# Patient Record
Sex: MALE | Race: WHITE | NOT HISPANIC OR LATINO | Employment: OTHER | RURAL
[De-identification: names, ages, dates, MRNs, and addresses within clinical notes are randomized per-mention and may not be internally consistent; named-entity substitution may affect disease eponyms.]

---

## 2017-08-15 RX ORDER — RIVAROXABAN 20 MG/1
TABLET, FILM COATED ORAL
Qty: 30 TABLET | Refills: 4 | Status: SHIPPED | OUTPATIENT
Start: 2017-08-15 | End: 2018-03-14 | Stop reason: SDUPTHER

## 2017-12-14 ENCOUNTER — OFFICE VISIT (OUTPATIENT)
Dept: CARDIOLOGY | Facility: CLINIC | Age: 82
End: 2017-12-14

## 2017-12-14 VITALS
DIASTOLIC BLOOD PRESSURE: 68 MMHG | BODY MASS INDEX: 28.11 KG/M2 | HEART RATE: 77 BPM | SYSTOLIC BLOOD PRESSURE: 126 MMHG | HEIGHT: 71 IN | WEIGHT: 200.8 LBS

## 2017-12-14 DIAGNOSIS — I48.21 PERMANENT ATRIAL FIBRILLATION (HCC): Primary | ICD-10-CM

## 2017-12-14 PROCEDURE — 93000 ELECTROCARDIOGRAM COMPLETE: CPT | Performed by: INTERNAL MEDICINE

## 2017-12-14 PROCEDURE — 99213 OFFICE O/P EST LOW 20 MIN: CPT | Performed by: INTERNAL MEDICINE

## 2017-12-14 NOTE — PROGRESS NOTES
Subjective:     Encounter Date:12/14/2017      Patient ID: Gagan Mayer is a 87 y.o. male.    Chief Complaint: CAF    History of Present Illness    Dear Dr. Antunez:    I had the pleasure of seeing Gagan Mayer in cardiac followup today. As you well know, he is a jean 87-year-old man with history of chronic atrial fibrillation. He is anticoagulated with Xarelto. He has anemia and takes iron infusions periodically. This seems to be effective in maintaining his blood counts.    Over the past year he continues to do well. He cares for a large house and is quite active. He has reduced the amount of bicycle riding that he does.         Review of Systems   All other systems reviewed and are negative.        ECG 12 Lead  Date/Time: 12/14/2017 10:38 AM  Performed by: CASI GILES  Authorized by: CASI GILES   Comparison: compared with previous ECG   Similar to previous ECG  Rhythm: atrial fibrillation  BPM: 77                 Objective:     Physical Exam   Constitutional: He is oriented to person, place, and time. He appears well-developed and well-nourished.   HENT:   Head: Normocephalic and atraumatic.   Neck: Normal range of motion. Neck supple.   Cardiovascular: Normal rate and normal heart sounds.  An irregularly irregular rhythm present.   Pulmonary/Chest: Effort normal and breath sounds normal.   Abdominal: Soft. Bowel sounds are normal.   Musculoskeletal: Normal range of motion.   Neurological: He is alert and oriented to person, place, and time.   Skin: Skin is warm and dry.   Psychiatric: He has a normal mood and affect. His behavior is normal. Thought content normal.   Vitals reviewed.      Lab Review:       Assessment:          Diagnosis Plan   1. Permanent atrial fibrillation            Plan:       It was a pleasure to see your patient in cardiac followup today.  He is doing quite well from a cardiac standpoint.  He is tolerating his anticoagulation for atrial fibrillation without difficulty.  He has no  other cardiac symptoms at this time.  He will see me again in one year or sooner if symptoms warrant.      Atrial Fibrillation and Atrial Flutter  Assessment  • The patient has permanent atrial fibrillation  • This is non-valvular in etiology  • The patient's CHADS2-VASc score is 2  • A [id number] score of 2 or more is considered a high risk for a thromboembolic event  • Rivaroxaban prescribed    Plan  • Continue in atrial fibrillation with rate control  • Continue rivaroxaban for antithrombotic therapy, bleeding issues discussed

## 2018-03-14 RX ORDER — RIVAROXABAN 20 MG/1
TABLET, FILM COATED ORAL
Qty: 30 TABLET | Refills: 4 | Status: SHIPPED | OUTPATIENT
Start: 2018-03-14 | End: 2018-09-23 | Stop reason: SDUPTHER

## 2018-09-24 RX ORDER — RIVAROXABAN 20 MG/1
TABLET, FILM COATED ORAL
Qty: 30 TABLET | Refills: 4 | Status: SHIPPED | OUTPATIENT
Start: 2018-09-24 | End: 2019-04-08 | Stop reason: SDUPTHER

## 2018-12-13 ENCOUNTER — OFFICE VISIT (OUTPATIENT)
Dept: CARDIOLOGY | Facility: CLINIC | Age: 83
End: 2018-12-13

## 2018-12-13 VITALS
SYSTOLIC BLOOD PRESSURE: 138 MMHG | DIASTOLIC BLOOD PRESSURE: 88 MMHG | RESPIRATION RATE: 18 BRPM | WEIGHT: 213 LBS | HEIGHT: 71 IN | HEART RATE: 82 BPM | BODY MASS INDEX: 29.82 KG/M2

## 2018-12-13 DIAGNOSIS — I48.21 PERMANENT ATRIAL FIBRILLATION (HCC): Primary | ICD-10-CM

## 2018-12-13 PROCEDURE — 99213 OFFICE O/P EST LOW 20 MIN: CPT | Performed by: INTERNAL MEDICINE

## 2018-12-13 PROCEDURE — 93000 ELECTROCARDIOGRAM COMPLETE: CPT | Performed by: INTERNAL MEDICINE

## 2018-12-13 NOTE — PROGRESS NOTES
Subjective:     Encounter Date:12/13/2018      Patient ID: Gagan Mayer is a 88 y.o. male.    Chief Complaint: CAF    History of Present Illness    Dear Dr. Antunez,     I had the pleasure of seeing Gagan Mayer in cardiac followup today.  As you well know, he is a jean 88-year-old male with history of chronic atrial fibrillation.  He is anticoagulated with rivaroxaban.  He has anemia and occasionally takes iron transfusions.      Since I have last seen him, he reports that he has done great.  He continues to live independently.  He is able to do all his self-care as well as care for a large house.  He has had no real medical problems since I have last seen him.          Review of Systems   All other systems reviewed and are negative.        ECG 12 Lead  Date/Time: 12/13/2018 9:33 AM  Performed by: Vahid Ramirez MD  Authorized by: Vahid Ramirez MD   Comparison: compared with previous ECG   Similar to previous ECG  Rhythm: atrial fibrillation  BPM: 82                 Objective:     Physical Exam   Constitutional: He is oriented to person, place, and time. He appears well-developed and well-nourished.   HENT:   Head: Normocephalic and atraumatic.   Neck: Normal range of motion. Neck supple.   Cardiovascular: Normal rate and normal heart sounds. An irregularly irregular rhythm present.   Pulmonary/Chest: Effort normal and breath sounds normal.   Abdominal: Soft. Bowel sounds are normal.   Musculoskeletal: Normal range of motion.   Neurological: He is alert and oriented to person, place, and time.   Skin: Skin is warm and dry.   Psychiatric: He has a normal mood and affect. His behavior is normal. Thought content normal.   Vitals reviewed.      Lab Review:       Assessment:          Diagnosis Plan   1. Permanent atrial fibrillation (CMS/HCC)            Plan:       It was a pleasure to see your patient in cardiac followup today.  He is doing very well from the cardiac standpoint.  He has no complaints.  He is going to  continue taking rivaroxaban.  He will see me again in one year or sooner if symptoms warrant.          Atrial Fibrillation and Atrial Flutter  Assessment  • The patient has permanent atrial fibrillation  • This is non-valvular in etiology  • The patient's CHADS2-VASc score is 2  • A [id number]-[id number] score of 2 or more is considered a high risk for a thromboembolic event  • Rivaroxaban prescribed    Plan  • Continue in atrial fibrillation with rate control  • Continue rivaroxaban for antithrombotic therapy, bleeding issues discussed

## 2019-04-08 RX ORDER — RIVAROXABAN 20 MG/1
TABLET, FILM COATED ORAL
Qty: 90 TABLET | Refills: 3 | Status: SHIPPED | OUTPATIENT
Start: 2019-04-08 | End: 2020-04-28 | Stop reason: SDUPTHER

## 2019-12-16 ENCOUNTER — OFFICE VISIT (OUTPATIENT)
Dept: CARDIOLOGY | Facility: CLINIC | Age: 84
End: 2019-12-16

## 2019-12-16 VITALS
HEART RATE: 76 BPM | BODY MASS INDEX: 28.42 KG/M2 | SYSTOLIC BLOOD PRESSURE: 140 MMHG | DIASTOLIC BLOOD PRESSURE: 78 MMHG | WEIGHT: 203 LBS | HEIGHT: 71 IN

## 2019-12-16 DIAGNOSIS — I48.21 PERMANENT ATRIAL FIBRILLATION (HCC): Primary | ICD-10-CM

## 2019-12-16 DIAGNOSIS — E78.5 HYPERLIPIDEMIA LDL GOAL <70: ICD-10-CM

## 2019-12-16 DIAGNOSIS — I10 ESSENTIAL HYPERTENSION: ICD-10-CM

## 2019-12-16 PROCEDURE — 99214 OFFICE O/P EST MOD 30 MIN: CPT | Performed by: INTERNAL MEDICINE

## 2019-12-16 PROCEDURE — 93000 ELECTROCARDIOGRAM COMPLETE: CPT | Performed by: INTERNAL MEDICINE

## 2019-12-16 NOTE — PROGRESS NOTES
Gagan Mayer  7/22/1930  Date of Office Visit: 12/16/19  Encounter Provider: Jose Morfin MD  Place of Service: [facility]      CHIEF COMPLAINT:  1. Chronic atrial fibrillation.  2. Chronic anticoagulant therapy.  3. Essential hypertension.        HISTORY OF PRESENT ILLNESS: I had the pleasure of seeing the patient in follow-up today.  He is a very pleasant, 89-year-old male with a medical history of permanent atrial fibrillation, essential hypertension, and iron-deficiency anemia with intermittent iron transfusions who presents to me as a transfer from Dr. Igor Ramirez.  His blood pressure is reasonably controlled considering his age.  He denies any palpitations or tachycardia.  He has no chest pain.  He does have bilateral lower extremity edema, which seems to be unchanged from prior.  He denies any improvement with elevation.  He has no orthopnea or paroxysmal nocturnal dyspnea.          Review of Systems   Constitution: Negative for fever and malaise/fatigue.   HENT: Negative for nosebleeds and sore throat.    Eyes: Negative for blurred vision and double vision.   Cardiovascular: Positive for leg swelling. Negative for chest pain, claudication, palpitations and syncope.   Respiratory: Negative for cough, shortness of breath and snoring.    Endocrine: Negative for cold intolerance, heat intolerance and polydipsia.   Skin: Negative for itching, poor wound healing and rash.   Musculoskeletal: Negative for joint pain, joint swelling, muscle weakness and myalgias.   Gastrointestinal: Negative for abdominal pain, melena, nausea and vomiting.   Neurological: Negative for light-headedness, loss of balance, seizures, vertigo and weakness.   Psychiatric/Behavioral: Negative for altered mental status and depression.          Past Medical History:   Diagnosis Date   • Atrial fibrillation (CMS/HCC)    • Hypertension    • Mitral valve insufficiency    • PVC (premature  "ventricular contraction)    • Stroke (CMS/HCC)    • Tricuspid regurgitation        The following portions of the patient's history were reviewed and updated as appropriate: Social history , Family history and Surgical history     Current Outpatient Medications on File Prior to Visit   Medication Sig Dispense Refill   • atorvastatin (LIPITOR) 10 MG tablet Take 1 tablet by mouth Daily.     • ferrous gluconate (FERGON) 324 MG tablet Take 1 tablet by mouth 2 (Two) Times a Day.     • lisinopril (PRINIVIL,ZESTRIL) 10 MG tablet Take 1 tablet by mouth Daily.     • XARELTO 20 MG tablet TAKE 1 TABLET EVERY DAY 90 tablet 3     No current facility-administered medications on file prior to visit.        No Known Allergies    Vitals:    12/16/19 1150   BP: 140/78   Pulse: 76   Weight: 92.1 kg (203 lb)   Height: 180.3 cm (71\")     Physical Exam   Constitutional: He is oriented to person, place, and time. He appears well-developed and well-nourished.   HENT:   Head: Normocephalic and atraumatic.   Eyes: Conjunctivae and EOM are normal. No scleral icterus.   Neck: Normal range of motion. Neck supple. Normal carotid pulses, no hepatojugular reflux and no JVD present. Carotid bruit is not present. No tracheal deviation present. No thyromegaly present.   Cardiovascular: Normal rate. An irregularly irregular rhythm present. Exam reveals no gallop and no friction rub.   No murmur heard.  Pulses:       Carotid pulses are 2+ on the right side, and 2+ on the left side.       Radial pulses are 2+ on the right side, and 2+ on the left side.        Femoral pulses are 2+ on the right side, and 2+ on the left side.       Dorsalis pedis pulses are 2+ on the right side, and 2+ on the left side.        Posterior tibial pulses are 2+ on the right side, and 2+ on the left side.   Pulmonary/Chest: Breath sounds normal. No respiratory distress. He has no decreased breath sounds. He has no wheezes. He has no rhonchi. He has no rales. He exhibits no " tenderness.   Abdominal: Soft. Bowel sounds are normal. He exhibits no distension. There is no tenderness. There is no rebound.   Musculoskeletal: He exhibits no edema or deformity.   Neurological: He is alert and oriented to person, place, and time. He has normal strength. No sensory deficit.   Skin: No rash noted. No erythema.   Psychiatric: He has a normal mood and affect. His behavior is normal.     ECG 12 Lead  Date/Time: 12/16/2019 11:58 AM  Performed by: Jose Morfin MD  Authorized by: Jose Morfin MD   Comparison: compared with previous ECG from 12/13/2018  Rhythm: atrial fibrillation  Rate: normal  QRS axis: normal    Clinical impression: abnormal EKG                  DISCUSSION/SUMMARYThe patient is a very pleasant, 89-year-old male with a medical history of permanent atrial fibrillation, mild mitral and tricuspid valve regurgitation, normal ejection fraction in 2012, essential hypertension, hyperlipidemia, and chronic anticoagulant therapy who presents to me as a transfer of care.    He is doing very well and, other than one to two plus bilateral lower extremity edema that is chronic, his examination is normal.      1. Atrial fibrillation permanent.  - Continue Xarelto therapy.  - He is not on beta-blockade secondary to his normal rate control without it.  - If he has additional issues with instability or increasing frequency of iron transfusions, I think we will want to consider getting him off anticoagulant therapy.  I do not think we need to do that at this point in time.  2.  Essential hypertension reasonably controlled considering age.  Continue current therapy.  3.  Mild mitral and tricuspid valve regurgitation noted on echocardiogram in 2012.    - I think we can consider a repeat echocardiogram in the next year.  His cardiac examination, however, is normal.

## 2020-05-06 ENCOUNTER — TELEPHONE (OUTPATIENT)
Dept: CARDIOLOGY | Facility: CLINIC | Age: 85
End: 2020-05-06

## 2020-05-06 NOTE — TELEPHONE ENCOUNTER
Pharmacy called and stated that the RX for Xarelto is going to be $491 for a month supply.  Would you like to change the medication?  Please advise.    CB: [phone]    Thanks,  Letty

## 2020-05-06 NOTE — TELEPHONE ENCOUNTER
I have never seen this patient.  If you would call him and ask him if not something that he wants to continue to pay for or he can have a telehealth visit we can discuss it.

## 2020-05-12 NOTE — TELEPHONE ENCOUNTER
Letty    The first available follow up at [facility] is October 1st.  Could you look at his schedule to see if I can add him somewhere?    Manuela CALIXTO

## 2020-05-12 NOTE — TELEPHONE ENCOUNTER
Spoke with patient and he stated that he had enough medication for the next 2 months.  He would like to schedule an appointment with [facility] in June some time when we open [facility] office.    TRACY-----Please schedule this patient for [facility] for some time in June.    Thanks,  Letty

## 2020-06-16 PROBLEM — I63.9 CEREBROVASCULAR ACCIDENT: Status: ACTIVE | Noted: 2020-06-16

## 2020-06-16 PROBLEM — D64.9 ANEMIA: Status: ACTIVE | Noted: 2020-06-16

## 2020-06-25 ENCOUNTER — OFFICE VISIT (OUTPATIENT)
Dept: CARDIOLOGY | Facility: CLINIC | Age: 85
End: 2020-06-25

## 2020-06-25 VITALS
OXYGEN SATURATION: 98 % | SYSTOLIC BLOOD PRESSURE: 122 MMHG | BODY MASS INDEX: 27.3 KG/M2 | WEIGHT: 195 LBS | DIASTOLIC BLOOD PRESSURE: 72 MMHG | HEIGHT: 71 IN

## 2020-06-25 DIAGNOSIS — I10 ESSENTIAL HYPERTENSION: ICD-10-CM

## 2020-06-25 DIAGNOSIS — D50.9 IRON DEFICIENCY ANEMIA, UNSPECIFIED IRON DEFICIENCY ANEMIA TYPE: Primary | ICD-10-CM

## 2020-06-25 PROCEDURE — 99214 OFFICE O/P EST MOD 30 MIN: CPT | Performed by: INTERNAL MEDICINE

## 2020-06-25 RX ORDER — FERROUS GLUCONATE 324(37.5)
324 TABLET ORAL 2 TIMES DAILY
COMMUNITY
Start: 2020-06-15

## 2020-06-25 NOTE — PROGRESS NOTES
[facility]      Patient Name: Gagan Mayer  :1930  Age: 89 y.o.  Encounter Provider:  Fede Joyner Jr, MD      Chief Complaint:   Chief Complaint   Patient presents with   • permanent afib     follow up         HPI  Gagan Mayer is a 89 y.o. male with past medical history of chronic atrial fibrillation, iron deficiency anemia status post multiple iron transfusions who presents for routine follow-up of chronic medical illness.    Per Dr. Morfin's last clinic note: He is a very pleasant, 89-year-old male with a medical history of permanent atrial fibrillation, essential hypertension, and iron-deficiency anemia with intermittent iron transfusions who presents to me as a transfer from Dr. Igor Ramirez.  His blood pressure is reasonably controlled considering his age.  He denies any palpitations or tachycardia.  He has no chest pain.  He does have bilateral lower extremity edema, which seems to be unchanged from prior.  He denies any improvement with elevation.  He has no orthopnea or paroxysmal nocturnal dyspnea.      Patient feels that he is doing well.  He notes that for the last few months he is having dizziness spells where he feels like he is walking on uneven ground.  It is always when he is in an upright position he does note that it happens when he changes position.  It never happens when he sitting down or lying down.  Denies any palpitations and has not had any syncope.  No chest pain or shortness of air.  Chronic lower extremity edema which she feels is stable.  Social and family history reviewed and not pertinent to this clinic visit.      The following portions of the patient's history were reviewed and updated as appropriate: allergies, current medications, past family history, past medical history, past social history, past surgical history and problem list.      Review of Systems   Constitution: Negative for chills and fever.   HENT: Negative for hoarse voice and sore  "throat.    Eyes: Negative for double vision and photophobia.   Cardiovascular: Positive for leg swelling. Negative for chest pain, near-syncope, orthopnea, palpitations, paroxysmal nocturnal dyspnea and syncope.   Respiratory: Negative for cough and wheezing.    Skin: Negative for poor wound healing and rash.   Musculoskeletal: Negative for arthritis and joint swelling.   Gastrointestinal: Negative for bloating, abdominal pain, hematemesis and hematochezia.   Neurological: Positive for dizziness. Negative for focal weakness.   Psychiatric/Behavioral: Negative for depression and suicidal ideas.       OBJECTIVE:   Vital Signs  Vitals:    06/25/20 0918   BP: 122/72   SpO2: 98%     Estimated body mass index is 28.31 kg/m² as calculated from the following:    Height as of 12/16/19: 180.3 cm (71\").    Weight as of 12/16/19: 92.1 kg (203 lb).    Physical Exam   Constitutional: He is oriented to person, place, and time. He appears well-developed and well-nourished.   HENT:   Head: Normocephalic and atraumatic.   Eyes: Pupils are equal, round, and reactive to light. Conjunctivae are normal.   Neck: No JVD present. No thyromegaly present.   Cardiovascular: Exam reveals no gallop and no friction rub.   No murmur heard.  Irregular rhythm   Pulmonary/Chest: No respiratory distress. He exhibits no tenderness.   Abdominal: Bowel sounds are normal. He exhibits no distension.   Musculoskeletal: He exhibits edema. He exhibits no tenderness.   Neurological: He is alert and oriented to person, place, and time.   Skin: No rash noted. No erythema.   Psychiatric: He has a normal mood and affect. Judgment normal.   Vitals reviewed.      Procedures          ASSESSMENT:     Chronic atrial fibrillation  Chronic anemia on oral anticoagulation  Dizziness    PLAN OF CARE:     1. Dizziness -sounds orthostatic in nature.  Orthostatic blood pressure measurements to be measured in clinic today.  Patient is not having any symptoms that would be " consistent with tachyarrhythmia.  Not currently on beta-blocker with controlled heart rate today in clinic.  Will check CBC and BMP and follow results of orthostatic measurements.  2. Chronic atrial fibrillation -seemingly rate controlled currently off of beta-blocker.  We had a long discussion about the risks and benefits of anticoagulation especially in the setting of chronic anemia.  He has had no overt evidence of brisk bleeding and after long discussion wants to continue on oral anticoagulation at this time.  We will continue to monitor clinical progress for further treatment recommendations.  3. Chronic anemia -in setting of dizziness we will check CBC.    Return to clinic 6 months           Discharge Medications           Accurate as of June 25, 2020  9:23 AM. If you have any questions, ask your nurse or doctor.               Continue These Medications      Instructions Start Date   atorvastatin 10 MG tablet  Commonly known as:  LIPITOR   1 tablet, Oral, Daily      ferrous gluconate 324 MG tablet  Commonly known as:  FERGON   1 tablet, Oral, 2 Times Daily      ferrous gluconate 324 (37.5 Fe) MG tablet tablet   324 mg, Oral, 2 Times Daily      lisinopril 10 MG tablet  Commonly known as:  PRINIVIL,ZESTRIL   1 tablet, Oral, Daily      rivaroxaban 20 MG tablet  Commonly known as:  Xarelto   20 mg, Oral, Daily             Thank you for allowing me to participate in the care of your patient,      Sincerely,   Fede Joyner Jr, MD  [facility]  06/25/20  09:23

## 2021-01-07 ENCOUNTER — OFFICE VISIT (OUTPATIENT)
Dept: CARDIOLOGY | Facility: CLINIC | Age: 86
End: 2021-01-07

## 2021-01-07 VITALS
HEART RATE: 81 BPM | DIASTOLIC BLOOD PRESSURE: 74 MMHG | RESPIRATION RATE: 16 BRPM | OXYGEN SATURATION: 98 % | WEIGHT: 205 LBS | BODY MASS INDEX: 28.7 KG/M2 | SYSTOLIC BLOOD PRESSURE: 118 MMHG | HEIGHT: 71 IN

## 2021-01-07 DIAGNOSIS — I48.21 PERMANENT ATRIAL FIBRILLATION (HCC): Primary | ICD-10-CM

## 2021-01-07 PROCEDURE — 99213 OFFICE O/P EST LOW 20 MIN: CPT | Performed by: INTERNAL MEDICINE

## 2021-01-07 NOTE — PROGRESS NOTES
[facility]      Patient Name: Gagan Mayer  :1930  Age: 89 y.o.  Encounter Provider:  Fede Joyner Jr, MD      Chief Complaint:   Chronic atrial fibrillation      HPI  Gagan Mayer is a 89 y.o. male with past medical history of chronic atrial fibrillation, iron deficiency anemia status post multiple iron transfusions who presents for routine follow-up of chronic medical illness.    Summary of clinic visitation: He is a very pleasant, 89-year-old male with a medical history of permanent atrial fibrillation, essential hypertension, and iron-deficiency anemia with intermittent iron transfusions who presents to me as a transfer from Dr. Igor Ramirez.  His blood pressure is reasonably controlled considering his age.  He denies any palpitations or tachycardia.  He has no chest pain.  He does have bilateral lower extremity edema, which seems to be unchanged from prior.  He denies any improvement with elevation.  He has no orthopnea or paroxysmal nocturnal dyspnea.    Patient feels that he is doing well.  He notes that for the last few months he is having dizziness spells where he feels like he is walking on uneven ground.  It is always when he is in an upright position he does note that it happens when he changes position.  It never happens when he sitting down or lying down.  Denies any palpitations and has not had any syncope.  No chest pain or shortness of air.  Chronic lower extremity edema which she feels is stable.  Social and family history reviewed and not pertinent to this clinic visit.    He is doing well since last visit.  He feels that his dizzy spells have decreased but he has unstable gait and uses a cane for walking.  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  With all that activity he denies chest pain, shortness of air or palpitations.  No syncope.  No orthopnea, PND.  He has chronic lower extremity edema which he feels is stable.  Social and  "family history reviewed and are not pertinent to this clinic visit.    The following portions of the patient's history were reviewed and updated as appropriate: allergies, current medications, past family history, past medical history, past social history, past surgical history and problem list.      Review of Systems   Constitution: Negative for chills and fever.   HENT: Negative for hoarse voice and sore throat.    Eyes: Negative for double vision and photophobia.   Cardiovascular: Positive for leg swelling. Negative for chest pain, near-syncope, orthopnea, palpitations, paroxysmal nocturnal dyspnea and syncope.   Respiratory: Negative for cough and wheezing.    Skin: Negative for poor wound healing and rash.   Musculoskeletal: Negative for arthritis and joint swelling.   Gastrointestinal: Negative for bloating, abdominal pain, hematemesis and hematochezia.   Neurological: Positive for dizziness. Negative for focal weakness.   Psychiatric/Behavioral: Negative for depression and suicidal ideas.     ROS was reviewed, updated amended when necessary.    OBJECTIVE:   Vital Signs  There were no vitals filed for this visit.  Estimated body mass index is 27.2 kg/m² as calculated from the following:    Height as of 6/25/20: 180.3 cm (71\").    Weight as of 6/25/20: 88.5 kg (195 lb).    Physical Exam   Constitutional: He is oriented to person, place, and time. He appears well-developed and well-nourished.   HENT:   Head: Normocephalic and atraumatic.   Eyes: Pupils are equal, round, and reactive to light. Conjunctivae are normal.   Neck: No JVD present. No thyromegaly present.   Cardiovascular: Exam reveals no gallop and no friction rub.   No murmur heard.  Irregular rhythm   Pulmonary/Chest: No respiratory distress. He exhibits no tenderness.   Abdominal: Bowel sounds are normal. He exhibits no distension.   Musculoskeletal:         General: Edema present. No tenderness.   Neurological: He is alert and oriented to person, " place, and time.   Skin: No rash noted. No erythema.   Psychiatric: He has a normal mood and affect. Judgment normal.   Vitals reviewed.    Physical exam was reviewed, updated amended when necessary.    Procedures          ASSESSMENT:     Chronic atrial fibrillation  Chronic anemia on oral anticoagulation  Dizziness    PLAN OF CARE:     1. Dizziness -improved.  Recommend compression stockings.  Avoid caffeine.  2. Chronic atrial fibrillation -seemingly rate controlled currently off of beta-blocker.  We had a long discussion about the risks and benefits of anticoagulation especially in the setting of chronic anemia.  He has had no overt evidence of brisk bleeding and after long discussion wants to continue on oral anticoagulation at this time.  We will continue to monitor clinical progress for further treatment recommendations.  3. Chronic anemia -defer to Dr. Antunez for ongoing work-up and management.    Return to clinic 12 months           Discharge Medications          Accurate as of January 7, 2021 10:33 AM. If you have any questions, ask your nurse or doctor.            Continue These Medications      Instructions Start Date   atorvastatin 10 MG tablet  Commonly known as: LIPITOR   1 tablet, Oral, Daily      ferrous gluconate 324 MG tablet  Commonly known as: FERGON   1 tablet, Oral, 2 Times Daily      ferrous gluconate 324 (37.5 Fe) MG tablet tablet   324 mg, Oral, 2 Times Daily      lisinopril 10 MG tablet  Commonly known as: PRINIVIL,ZESTRIL   1 tablet, Oral, Daily      rivaroxaban 20 MG tablet  Commonly known as: Xarelto   20 mg, Oral, Daily             Thank you for allowing me to participate in the care of your patient,      Sincerely,   Juliann Coles MA  [facility]  01/07/21  10:33 EST

## 2021-05-10 RX ORDER — RIVAROXABAN 20 MG/1
TABLET, FILM COATED ORAL
Qty: 90 TABLET | Refills: 3 | Status: SHIPPED | OUTPATIENT
Start: 2021-05-10 | End: 2022-05-18

## 2022-05-18 RX ORDER — RIVAROXABAN 20 MG/1
TABLET, FILM COATED ORAL
Qty: 30 TABLET | Refills: 1 | Status: SHIPPED | OUTPATIENT
Start: 2022-05-18 | End: 2022-07-18

## 2022-07-18 RX ORDER — RIVAROXABAN 20 MG/1
TABLET, FILM COATED ORAL
Qty: 90 TABLET | Refills: 3 | Status: SHIPPED | OUTPATIENT
Start: 2022-07-18

## 2023-08-04 RX ORDER — RIVAROXABAN 20 MG/1
TABLET, FILM COATED ORAL
Qty: 90 TABLET | Refills: 0 | Status: SHIPPED | OUTPATIENT
Start: 2023-08-04

## 2023-08-07 ENCOUNTER — TELEPHONE (OUTPATIENT)
Dept: CARDIOLOGY | Facility: CLINIC | Age: 88
End: 2023-08-07
Payer: MEDICARE

## 2023-08-07 NOTE — TELEPHONE ENCOUNTER
We received a refill request for Xarelto on patient.  In Felicity's absence CHAYITO Samson refilled med for one 90 day refill, and noted patient will need to be seen prior to next refill.  I called and left a detailed message for patient's daughter asking her to call to confirm if patient is following a cardiologist in [place] or if plans to stay with our practice.  If so he will need an appt. / BRYSON

## 2023-08-10 NOTE — TELEPHONE ENCOUNTER
I called and spoke with patient's daughter Beverley (per patient's request)   [phone].  Patient now following with Dr. Thomas, cardiologist. / BRYSON

## 2023-09-05 RX ORDER — RIVAROXABAN 20 MG/1
TABLET, FILM COATED ORAL
Qty: 90 TABLET | Refills: 0 | OUTPATIENT
Start: 2023-09-05

## 2023-09-08 NOTE — TELEPHONE ENCOUNTER
Called patient to schedule follow up with Dr. Joyner or CHAYITO. No answer and no voicemail set up. Letter sent requesting patient call to schedule a follow up for continued refills.     Thank you,   Marilyn

## 2023-11-20 RX ORDER — RIVAROXABAN 20 MG/1
TABLET, FILM COATED ORAL
Qty: 90 TABLET | Refills: 0 | OUTPATIENT
Start: 2023-11-20

## 2023-12-11 RX ORDER — RIVAROXABAN 20 MG/1
TABLET, FILM COATED ORAL
Qty: 30 TABLET | Refills: 0 | Status: SHIPPED | OUTPATIENT
Start: 2023-12-11

## 2024-01-02 RX ORDER — RIVAROXABAN 20 MG/1
TABLET, FILM COATED ORAL
Qty: 30 TABLET | Refills: 0 | OUTPATIENT
Start: 2024-01-02

## 2024-01-02 NOTE — TELEPHONE ENCOUNTER
I will not refill Xarelto on a patient I have not seen since 2021.  If age prohibits him coming to clinic visits they will have to get refills from the physician that sees him the most, presumably primary care.